# Patient Record
Sex: MALE | Race: OTHER | NOT HISPANIC OR LATINO | URBAN - METROPOLITAN AREA
[De-identification: names, ages, dates, MRNs, and addresses within clinical notes are randomized per-mention and may not be internally consistent; named-entity substitution may affect disease eponyms.]

---

## 2022-04-23 ENCOUNTER — EMERGENCY (EMERGENCY)
Facility: HOSPITAL | Age: 22
LOS: 1 days | Discharge: HOME | End: 2022-04-23
Admitting: EMERGENCY MEDICINE
Payer: COMMERCIAL

## 2022-04-23 VITALS
RESPIRATION RATE: 16 BRPM | OXYGEN SATURATION: 100 % | HEART RATE: 98 BPM | SYSTOLIC BLOOD PRESSURE: 159 MMHG | TEMPERATURE: 98 F | DIASTOLIC BLOOD PRESSURE: 94 MMHG

## 2022-04-23 DIAGNOSIS — F41.9 ANXIETY DISORDER, UNSPECIFIED: ICD-10-CM

## 2022-04-23 PROCEDURE — 99284 EMERGENCY DEPT VISIT MOD MDM: CPT

## 2022-04-23 PROCEDURE — 90792 PSYCH DIAG EVAL W/MED SRVCS: CPT | Mod: GC

## 2022-04-23 NOTE — ED BEHAVIORAL HEALTH ASSESSMENT NOTE - CASE SUMMARY
21M, single, no kids, living w/ parents, working as MA and in college, no formal psych hx/PPH/SA/NSSIB/violence/legal hx/abuse, intermittent MJ use, PMH asthma, HTN, presenting for anxiety.  See resident hx above.  briefly, pt experiencing worsening anxiety, largely about his health, which has intermittently impacted his ability to work.  no sx of depression/jamey/psychosis (pt denies SI/HI/AVH).  pt given crisis referral (walk in appt M/T) and discharged.  pt's mother in agreement w/ plan
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
## 2022-04-23 NOTE — ED BEHAVIORAL HEALTH ASSESSMENT NOTE - REFERRAL / APPOINTMENT DETAILS
Pt was given resources with information about [facility] and advised about follow up to connect with outpatient psychiatry

## 2022-04-23 NOTE — ED PROVIDER NOTE - CLINICAL SUMMARY MEDICAL DECISION MAKING FREE TEXT BOX
20 y/o M  BIB family  to  recurrent anxiety. Admits to multiple  social stressors including the passing of a brother over the past 7 months.  Denies SI/HI/AH/VH. Denies falling, punching or kicking any objects. Denies pain, SOB, fever, chills, chest/abdominal discomfort. No evidence of physical injuries, broken  skin or deformities. Denies use of alcohol or illicit drugs.

## 2022-04-23 NOTE — ED BEHAVIORAL HEALTH ASSESSMENT NOTE - DESCRIPTION
calm and cooperative, no IM prns or restraints for agitation.    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, lives with parents and paternal grandparents asthma, HTN calm and cooperative, no IM prns or restraints for agitation.     Vital Signs:  · BP Systolic	159 mm Hg  · BP Diastolic	94 mm Hg  · Heart Rate	98 /min  · Respiration Rate (breaths/min)	16 /min  · Temp (F)	98 Degrees F  · Temp (C)	36.7 Degrees C  · Temp site	temporal  · SpO2 (%)	100 %  · O2 Delivery/Oxygen Delivery Method	room air
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
## 2022-04-23 NOTE — ED PROVIDER NOTE - PATIENT PORTAL LINK FT
You can access the FollowMyHealth Patient Portal offered by [facility] by registering at the following website: [email or web address]. By joining [employer]’s FollowMyHealth portal, you will also be able to view your health information using other applications (apps) compatible with our system.

## 2022-04-23 NOTE — ED PROVIDER NOTE - OBJECTIVE STATEMENT
22 y/o M  BIB family  to  recurrent anxiety. Admits to multiple  social stressors including the passing of a brother over the past 7 months.  Denies SI/HI/AH/VH. Denies falling, punching or kicking any objects. Denies pain, SOB, fever, chills, chest/abdominal discomfort. No evidence of physical injuries, broken  skin or deformities. Denies use of alcohol or illicit drugs.

## 2022-04-23 NOTE — ED BEHAVIORAL HEALTH ASSESSMENT NOTE - NSSUICPROTFACT_PSY_ALL_CORE
denies siip, wants to live/Responsibility to children, family, or others/Identifies reasons for living/Supportive social network of family or friends/Fear of death or the actual act of killing self/Engaged in work or school/Beloved pets

## 2022-04-23 NOTE — ED ADULT NURSE NOTE - CHIEF COMPLAINT QUOTE
pt c/o increasing feelings of anxiety. States brother passed 7months ago and since then has been feeling anxious and "cloudiness". Denies SI/HI, hallucinations. Calm and cooperative in triage. No psych history. Pt seen at [facility] recently for episodes of chest pains, had negative workup done. Denies physical symptoms currently. Mother Bladimir [phone].

## 2022-04-23 NOTE — ED BEHAVIORAL HEALTH ASSESSMENT NOTE - OTHER
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

## 2022-04-23 NOTE — ED BEHAVIORAL HEALTH ASSESSMENT NOTE - DIFFERENTIAL
The differential includes illness anxiety disorder, PTSD, MERRY with panic attacks, substance induced mood disorder, with secondary diagnosis of MJ use disorder.

## 2022-04-23 NOTE — ED ADULT TRIAGE NOTE - CHIEF COMPLAINT QUOTE
pt c/o increasing feelings of anxiety. States brother passed 7months ago and since then has been feeling anxious and "cloudiness". Denies SI/HI, hallucinations. Calm and cooperative in triage. No psych history. Pt seen at [facility] recently for episodes of chest pains, had negative workup done. Denies physical symptoms currently. Mother Bladimir [phone].
